# Patient Record
Sex: FEMALE | Race: WHITE | Employment: FULL TIME | URBAN - METROPOLITAN AREA
[De-identification: names, ages, dates, MRNs, and addresses within clinical notes are randomized per-mention and may not be internally consistent; named-entity substitution may affect disease eponyms.]

---

## 2017-04-20 PROCEDURE — 88175 CYTOPATH C/V AUTO FLUID REDO: CPT | Performed by: OBSTETRICS & GYNECOLOGY

## 2018-05-03 PROCEDURE — 88175 CYTOPATH C/V AUTO FLUID REDO: CPT | Performed by: OBSTETRICS & GYNECOLOGY

## 2018-11-16 ENCOUNTER — HOSPITAL ENCOUNTER (EMERGENCY)
Age: 38
Discharge: HOME | End: 2018-11-17
Attending: EMERGENCY MEDICINE
Payer: COMMERCIAL

## 2018-11-16 DIAGNOSIS — M54.32 SCIATICA OF LEFT SIDE: Primary | ICD-10-CM

## 2018-11-16 PROCEDURE — 99284 EMERGENCY DEPT VISIT MOD MDM: CPT

## 2018-11-16 PROCEDURE — 96374 THER/PROPH/DIAG INJ IV PUSH: CPT

## 2018-11-16 PROCEDURE — 96375 TX/PRO/DX INJ NEW DRUG ADDON: CPT

## 2018-11-16 RX ORDER — KETOROLAC TROMETHAMINE 30 MG/ML
30 INJECTION, SOLUTION INTRAMUSCULAR; INTRAVENOUS ONCE
Status: COMPLETED | OUTPATIENT
Start: 2018-11-16 | End: 2018-11-16

## 2018-11-16 RX ORDER — METHYLPREDNISOLONE 4 MG/1
TABLET ORAL AS DIRECTED
COMMUNITY
End: 2019-05-10 | Stop reason: ALTCHOICE

## 2018-11-16 RX ORDER — LORAZEPAM 2 MG/ML
0.5 INJECTION INTRAMUSCULAR ONCE
Status: COMPLETED | OUTPATIENT
Start: 2018-11-16 | End: 2018-11-16

## 2018-11-16 RX ORDER — METHOCARBAMOL 750 MG/1
750 TABLET, FILM COATED ORAL 4 TIMES DAILY
COMMUNITY
End: 2019-05-10 | Stop reason: ALTCHOICE

## 2018-11-16 RX ORDER — MORPHINE SULFATE 4 MG/ML
4 INJECTION, SOLUTION INTRAMUSCULAR; INTRAVENOUS EVERY 30 MIN PRN
Status: DISCONTINUED | OUTPATIENT
Start: 2018-11-16 | End: 2018-11-17

## 2018-11-17 VITALS
RESPIRATION RATE: 18 BRPM | SYSTOLIC BLOOD PRESSURE: 122 MMHG | TEMPERATURE: 98 F | HEART RATE: 58 BPM | WEIGHT: 213 LBS | OXYGEN SATURATION: 100 % | HEIGHT: 68 IN | DIASTOLIC BLOOD PRESSURE: 70 MMHG | BODY MASS INDEX: 32.28 KG/M2

## 2018-11-17 RX ORDER — ONDANSETRON 2 MG/ML
4 INJECTION INTRAMUSCULAR; INTRAVENOUS ONCE
Status: COMPLETED | OUTPATIENT
Start: 2018-11-17 | End: 2018-11-17

## 2018-11-17 RX ORDER — LORAZEPAM 1 MG/1
1 TABLET ORAL 2 TIMES DAILY PRN
Qty: 20 TABLET | Refills: 0 | Status: SHIPPED | OUTPATIENT
Start: 2018-11-17 | End: 2018-11-24

## 2018-11-17 RX ORDER — HYDROCODONE BITARTRATE AND ACETAMINOPHEN 5; 325 MG/1; MG/1
1-2 TABLET ORAL EVERY 4 HOURS PRN
Qty: 20 TABLET | Refills: 0 | Status: SHIPPED | OUTPATIENT
Start: 2018-11-17 | End: 2018-11-24

## 2018-11-17 NOTE — ED PROVIDER NOTES
Patient Seen in: THE [facility] Emergency Department In [place]    History   Patient presents with:  Back Pain (musculoskeletal)    Stated Complaint: BACK PAIN    HPI    For approximately the past 2 weeks the patient has had dull pain in the left lower back r muscles on the left and into the region of the left SI joint. Abdomen: Soft and nontender without mass or HSM. No CVA tenderness. Extremities: No peripheral edema or evidence of DVT.   No joint tenderness or swelling  Neuro: Strength and sensation intact

## 2018-11-17 NOTE — ED INITIAL ASSESSMENT (HPI)
Pt, seen at immediate care on Monday and prescribed meds (Methocarbamol 750mg, medrol dosepak), presents with lower back pain that radiates down her left hip x 2 weeks. She denies trauma.

## 2019-05-10 PROCEDURE — 88175 CYTOPATH C/V AUTO FLUID REDO: CPT | Performed by: OBSTETRICS & GYNECOLOGY

## (undated) NOTE — ED AVS SNAPSHOT
Zack Simms   MRN: [id number]    Department:  [facility] Emergency Department in [place]   Date of Visit:  11/16/2018           Disclosure     Insurance plans vary and the physician(s) referred by the ER may not be covered by your plan.  Please contact tell this physician (or your personal doctor if your instructions are to return to your personal doctor) about any new or lasting problems. The primary care or specialist physician will see patients referred from the [facility] Emergency Department.  Wing Kuo